# Patient Record
Sex: FEMALE | Race: WHITE | NOT HISPANIC OR LATINO | Employment: UNEMPLOYED | URBAN - METROPOLITAN AREA
[De-identification: names, ages, dates, MRNs, and addresses within clinical notes are randomized per-mention and may not be internally consistent; named-entity substitution may affect disease eponyms.]

---

## 2017-07-19 ENCOUNTER — OFFICE VISIT (OUTPATIENT)
Dept: CARDIOTHORACIC SURGERY | Facility: CLINIC | Age: 62
End: 2017-07-19
Payer: MEDICAID

## 2017-07-19 VITALS
HEIGHT: 65 IN | TEMPERATURE: 99 F | OXYGEN SATURATION: 99 % | BODY MASS INDEX: 19.49 KG/M2 | WEIGHT: 117 LBS | DIASTOLIC BLOOD PRESSURE: 59 MMHG | SYSTOLIC BLOOD PRESSURE: 94 MMHG | HEART RATE: 97 BPM

## 2017-07-19 DIAGNOSIS — C44.92 SCC (SQUAMOUS CELL CARCINOMA): ICD-10-CM

## 2017-07-19 DIAGNOSIS — I71.21 ASCENDING AORTIC ANEURYSM: ICD-10-CM

## 2017-07-19 PROCEDURE — 99205 OFFICE O/P NEW HI 60 MIN: CPT | Mod: S$PBB,,, | Performed by: THORACIC SURGERY (CARDIOTHORACIC VASCULAR SURGERY)

## 2017-07-19 PROCEDURE — 99999 PR PBB SHADOW E&M-NEW PATIENT-LVL III: CPT | Mod: PBBFAC,,, | Performed by: THORACIC SURGERY (CARDIOTHORACIC VASCULAR SURGERY)

## 2017-07-19 PROCEDURE — 99203 OFFICE O/P NEW LOW 30 MIN: CPT | Mod: PBBFAC | Performed by: THORACIC SURGERY (CARDIOTHORACIC VASCULAR SURGERY)

## 2017-07-19 RX ORDER — DIAZEPAM 2 MG/1
TABLET ORAL
COMMUNITY
Start: 2017-06-25

## 2017-07-19 RX ORDER — FENTANYL 25 UG/1
PATCH TRANSDERMAL
COMMUNITY
Start: 2017-06-12

## 2017-07-19 RX ORDER — ONDANSETRON 4 MG/1
TABLET, ORALLY DISINTEGRATING ORAL
COMMUNITY
Start: 2017-06-01

## 2017-07-19 RX ORDER — HYDROCORTISONE 25 MG/G
CREAM TOPICAL
COMMUNITY
Start: 2017-07-04

## 2017-07-19 RX ORDER — SERTRALINE HYDROCHLORIDE 50 MG/1
TABLET, FILM COATED ORAL
COMMUNITY
Start: 2017-07-14

## 2017-07-19 RX ORDER — LIDOCAINE HYDROCHLORIDE 20 MG/ML
JELLY TOPICAL
COMMUNITY
Start: 2017-07-11

## 2017-07-19 RX ORDER — HYDROCODONE BITARTRATE AND ACETAMINOPHEN 7.5; 325 MG/1; MG/1
TABLET ORAL
COMMUNITY
Start: 2017-04-29

## 2017-07-19 NOTE — LETTER
July 19, 2017      Jaren Brand MD  [address]  [place] [address]           [facility]  [address]  [facility] [address]  Phone: [phone]          Patient: Maria Eugenia Hicks   MR Number: [id number]   YOB: 1955   Date of Visit: 7/19/2017       Dear Dr. Jaren Brand:    Thank you for referring Maria Eugenia Hicks to me for evaluation. Attached you will find relevant portions of my assessment and plan of care.    If you have questions, please do not hesitate to call me. I look forward to following Maria Eugenia Hicks along with you.    Sincerely,    Niko Reynaga MD    Enclosure  CC:  No Recipients    If you would like to receive this communication electronically, please contact [email or web address] or [phone] to request more information on [employer] access.    For providers and/or their staff who would like to refer a patient to [facility], please contact us through our one-stop-shop provider referral line, [facility], at [phone].    If you feel you have received this communication in error or would no longer like to receive these types of communications, please e-mail [email or web address]

## 2017-07-19 NOTE — PROGRESS NOTES
History & Physical    SUBJECTIVE:     History of Present Illness:  Patient is a 61 y.o. female presents with PMH of metastatic SCC that was treated at [facility] with chemo and radiation. Pt still has a port a cath and PEG tube feeding. Pt denies smoking and alcohol use. Pt is here today for evaluation of TAA and second opinion. Pt has been followed at [facility] for TAA and CT report measures 4.2cm. Pt has no complaints and works in yard everyday. Denies CP, SOB, dizziness or weakness.     Chief Complaint   Patient presents with    Consult       Review of patient's allergies indicates:  No Known Allergies    Current Outpatient Prescriptions   Medication Sig Dispense Refill    diazePAM (VALIUM) 2 MG tablet       fentaNYL (DURAGESIC) 25 mcg/hr       hydrocodone-acetaminophen 7.5-325mg (NORCO) 7.5-325 mg per tablet       hydrocortisone 2.5 % cream       lidocaine HCL 2% (XYLOCAINE) 2 % jelly       ondansetron (ZOFRAN-ODT) 4 MG TbDL       sertraline (ZOLOFT) 50 MG tablet        No current facility-administered medications for this visit.        No past medical history on file.  No past surgical history on file.  No family history on file.  Social History   Substance Use Topics    Smoking status: Never Smoker    Smokeless tobacco: Not on file    Alcohol use Not on file        Review of Systems:  Review of Systems   Constitutional: Negative for activity change.   Respiratory: Negative for shortness of breath.    Cardiovascular: Negative for chest pain, palpitations and leg swelling.   Gastrointestinal: Negative for abdominal pain, nausea and vomiting.   Endocrine: Negative for polydipsia, polyphagia and polyuria.   Genitourinary: Negative for dysuria.   Musculoskeletal: Positive for gait problem.   Skin: Negative for rash.   Allergic/Immunologic: Positive for immunocompromised state.   Neurological: Negative for dizziness, syncope and weakness.   Hematological: Does not bruise/bleed easily.   Psychiatric/Behavioral:  "Negative for behavioral problems.       OBJECTIVE:     Vital Signs (Most Recent)  Temp: 98.6 °F (37 °C) (07/19/17 1043)  Pulse: 97 (07/19/17 1043)  BP: (!) 94/59 (07/19/17 1043)  SpO2: 99 % (07/19/17 1043)  5' 5" (1.651 m)  53.1 kg (117 lb)     Physical Exam:  Physical Exam   Constitutional: She is oriented to person, place, and time. She appears well-developed.   Frail in wheelchair    Cardiovascular: Normal rate, regular rhythm and normal heart sounds.    Pulmonary/Chest: Effort normal and breath sounds normal.   Abdominal: Soft.   Neurological: She is alert and oriented to person, place, and time.   Skin: Skin is warm and dry. There is pallor.       Diagnostic Results:  CT reviewed from [facility] but no disc   -Ascending aorta measuring 4.2cm    ASSESSMENT/PLAN:   Patient is a 61 y.o. female presents with PMH of metastatic SCC and here for TAA evaluation     PLAN: The report says 4.2 cm ascending aorta. Recent treatment for SCC of throat. I counseled her and her daughter about aneurysms. She does not smoke and BP well controlled. The films are not available today for review. I am recommending F/U scan in 1 year. She can do this at [facility] if she wants. She will contact us if she wants to return.  "